# Patient Record
Sex: FEMALE | Race: OTHER | HISPANIC OR LATINO | URBAN - METROPOLITAN AREA
[De-identification: names, ages, dates, MRNs, and addresses within clinical notes are randomized per-mention and may not be internally consistent; named-entity substitution may affect disease eponyms.]

---

## 2023-09-02 ENCOUNTER — EMERGENCY (EMERGENCY)
Facility: HOSPITAL | Age: 43
LOS: 1 days | Discharge: HOME | End: 2023-09-02
Attending: EMERGENCY MEDICINE
Payer: MEDICAID

## 2023-09-02 VITALS
WEIGHT: 177.91 LBS | OXYGEN SATURATION: 99 % | SYSTOLIC BLOOD PRESSURE: 122 MMHG | TEMPERATURE: 99 F | RESPIRATION RATE: 18 BRPM | DIASTOLIC BLOOD PRESSURE: 77 MMHG | HEART RATE: 86 BPM

## 2023-09-02 PROCEDURE — 99285 EMERGENCY DEPT VISIT HI MDM: CPT

## 2023-09-03 VITALS
RESPIRATION RATE: 16 BRPM | TEMPERATURE: 98 F | DIASTOLIC BLOOD PRESSURE: 67 MMHG | SYSTOLIC BLOOD PRESSURE: 120 MMHG | HEART RATE: 81 BPM | OXYGEN SATURATION: 97 %

## 2023-09-03 LAB
ALBUMIN SERPL ELPH-MCNC: 4.3 G/DL — SIGNIFICANT CHANGE UP (ref 3.3–5)
ALP SERPL-CCNC: 52 U/L — SIGNIFICANT CHANGE UP (ref 40–120)
ALT FLD-CCNC: 20 U/L — SIGNIFICANT CHANGE UP (ref 10–45)
ANION GAP SERPL CALC-SCNC: 12 MMOL/L — SIGNIFICANT CHANGE UP (ref 5–17)
APPEARANCE UR: CLEAR — SIGNIFICANT CHANGE UP
AST SERPL-CCNC: 25 U/L — SIGNIFICANT CHANGE UP (ref 10–40)
BACTERIA # UR AUTO: ABNORMAL
BASOPHILS # BLD AUTO: 0.03 K/UL — SIGNIFICANT CHANGE UP (ref 0–0.2)
BASOPHILS NFR BLD AUTO: 0.6 % — SIGNIFICANT CHANGE UP (ref 0–2)
BILIRUB SERPL-MCNC: 1.4 MG/DL — HIGH (ref 0.2–1.2)
BILIRUB UR-MCNC: NEGATIVE — SIGNIFICANT CHANGE UP
BUN SERPL-MCNC: 9 MG/DL — SIGNIFICANT CHANGE UP (ref 7–23)
CALCIUM SERPL-MCNC: 9.2 MG/DL — SIGNIFICANT CHANGE UP (ref 8.4–10.5)
CHLORIDE SERPL-SCNC: 105 MMOL/L — SIGNIFICANT CHANGE UP (ref 96–108)
CO2 SERPL-SCNC: 22 MMOL/L — SIGNIFICANT CHANGE UP (ref 22–31)
COLOR SPEC: SIGNIFICANT CHANGE UP
CREAT SERPL-MCNC: 0.51 MG/DL — SIGNIFICANT CHANGE UP (ref 0.5–1.3)
DIFF PNL FLD: NEGATIVE — SIGNIFICANT CHANGE UP
EGFR: 121 ML/MIN/1.73M2 — SIGNIFICANT CHANGE UP
EOSINOPHIL # BLD AUTO: 0.06 K/UL — SIGNIFICANT CHANGE UP (ref 0–0.5)
EOSINOPHIL NFR BLD AUTO: 1.1 % — SIGNIFICANT CHANGE UP (ref 0–6)
EPI CELLS # UR: 6 /HPF — HIGH
GLUCOSE SERPL-MCNC: 96 MG/DL — SIGNIFICANT CHANGE UP (ref 70–99)
GLUCOSE UR QL: NEGATIVE — SIGNIFICANT CHANGE UP
HCG SERPL-ACNC: <2 MIU/ML — SIGNIFICANT CHANGE UP
HCT VFR BLD CALC: 37.2 % — SIGNIFICANT CHANGE UP (ref 34.5–45)
HGB BLD-MCNC: 12.3 G/DL — SIGNIFICANT CHANGE UP (ref 11.5–15.5)
HYALINE CASTS # UR AUTO: 1 /LPF — SIGNIFICANT CHANGE UP (ref 0–2)
IMM GRANULOCYTES NFR BLD AUTO: 0.2 % — SIGNIFICANT CHANGE UP (ref 0–0.9)
KETONES UR-MCNC: NEGATIVE — SIGNIFICANT CHANGE UP
LEUKOCYTE ESTERASE UR-ACNC: NEGATIVE — SIGNIFICANT CHANGE UP
LIDOCAIN IGE QN: 23 U/L — SIGNIFICANT CHANGE UP (ref 7–60)
LYMPHOCYTES # BLD AUTO: 2.1 K/UL — SIGNIFICANT CHANGE UP (ref 1–3.3)
LYMPHOCYTES # BLD AUTO: 39.7 % — SIGNIFICANT CHANGE UP (ref 13–44)
MCHC RBC-ENTMCNC: 29.8 PG — SIGNIFICANT CHANGE UP (ref 27–34)
MCHC RBC-ENTMCNC: 33.1 GM/DL — SIGNIFICANT CHANGE UP (ref 32–36)
MCV RBC AUTO: 90.1 FL — SIGNIFICANT CHANGE UP (ref 80–100)
MONOCYTES # BLD AUTO: 0.73 K/UL — SIGNIFICANT CHANGE UP (ref 0–0.9)
MONOCYTES NFR BLD AUTO: 13.8 % — SIGNIFICANT CHANGE UP (ref 2–14)
NEUTROPHILS # BLD AUTO: 2.36 K/UL — SIGNIFICANT CHANGE UP (ref 1.8–7.4)
NEUTROPHILS NFR BLD AUTO: 44.6 % — SIGNIFICANT CHANGE UP (ref 43–77)
NITRITE UR-MCNC: NEGATIVE — SIGNIFICANT CHANGE UP
NRBC # BLD: 0 /100 WBCS — SIGNIFICANT CHANGE UP (ref 0–0)
PH UR: 6.5 — SIGNIFICANT CHANGE UP (ref 5–8)
PLATELET # BLD AUTO: 211 K/UL — SIGNIFICANT CHANGE UP (ref 150–400)
POTASSIUM SERPL-MCNC: 4.1 MMOL/L — SIGNIFICANT CHANGE UP (ref 3.5–5.3)
POTASSIUM SERPL-SCNC: 4.1 MMOL/L — SIGNIFICANT CHANGE UP (ref 3.5–5.3)
PROT SERPL-MCNC: 7.2 G/DL — SIGNIFICANT CHANGE UP (ref 6–8.3)
PROT UR-MCNC: NEGATIVE — SIGNIFICANT CHANGE UP
RAPID RVP RESULT: DETECTED
RBC # BLD: 4.13 M/UL — SIGNIFICANT CHANGE UP (ref 3.8–5.2)
RBC # FLD: 13.3 % — SIGNIFICANT CHANGE UP (ref 10.3–14.5)
RBC CASTS # UR COMP ASSIST: 2 /HPF — SIGNIFICANT CHANGE UP (ref 0–4)
SARS-COV-2 RNA SPEC QL NAA+PROBE: DETECTED
SODIUM SERPL-SCNC: 139 MMOL/L — SIGNIFICANT CHANGE UP (ref 135–145)
SP GR SPEC: 1.01 — LOW (ref 1.01–1.02)
TROPONIN T, HIGH SENSITIVITY RESULT: <6 NG/L — SIGNIFICANT CHANGE UP (ref 0–51)
UROBILINOGEN FLD QL: NEGATIVE — SIGNIFICANT CHANGE UP
WBC # BLD: 5.29 K/UL — SIGNIFICANT CHANGE UP (ref 3.8–10.5)
WBC # FLD AUTO: 5.29 K/UL — SIGNIFICANT CHANGE UP (ref 3.8–10.5)
WBC UR QL: 1 /HPF — SIGNIFICANT CHANGE UP (ref 0–5)

## 2023-09-03 PROCEDURE — 96374 THER/PROPH/DIAG INJ IV PUSH: CPT

## 2023-09-03 PROCEDURE — 96375 TX/PRO/DX INJ NEW DRUG ADDON: CPT

## 2023-09-03 PROCEDURE — 84484 ASSAY OF TROPONIN QUANT: CPT

## 2023-09-03 PROCEDURE — 71045 X-RAY EXAM CHEST 1 VIEW: CPT | Mod: 26

## 2023-09-03 PROCEDURE — 81001 URINALYSIS AUTO W/SCOPE: CPT

## 2023-09-03 PROCEDURE — 83690 ASSAY OF LIPASE: CPT

## 2023-09-03 PROCEDURE — 99285 EMERGENCY DEPT VISIT HI MDM: CPT | Mod: 25

## 2023-09-03 PROCEDURE — 85025 COMPLETE CBC W/AUTO DIFF WBC: CPT

## 2023-09-03 PROCEDURE — 36415 COLL VENOUS BLD VENIPUNCTURE: CPT

## 2023-09-03 PROCEDURE — 84702 CHORIONIC GONADOTROPIN TEST: CPT

## 2023-09-03 PROCEDURE — 80053 COMPREHEN METABOLIC PANEL: CPT

## 2023-09-03 PROCEDURE — 87086 URINE CULTURE/COLONY COUNT: CPT

## 2023-09-03 PROCEDURE — 0225U NFCT DS DNA&RNA 21 SARSCOV2: CPT

## 2023-09-03 PROCEDURE — 71045 X-RAY EXAM CHEST 1 VIEW: CPT

## 2023-09-03 PROCEDURE — 93005 ELECTROCARDIOGRAM TRACING: CPT

## 2023-09-03 RX ORDER — KETOROLAC TROMETHAMINE 30 MG/ML
15 SYRINGE (ML) INJECTION ONCE
Refills: 0 | Status: DISCONTINUED | OUTPATIENT
Start: 2023-09-03 | End: 2023-09-03

## 2023-09-03 RX ORDER — FAMOTIDINE 10 MG/ML
20 INJECTION INTRAVENOUS DAILY
Refills: 0 | Status: DISCONTINUED | OUTPATIENT
Start: 2023-09-03 | End: 2023-09-06

## 2023-09-03 RX ORDER — FAMOTIDINE 10 MG/ML
20 INJECTION INTRAVENOUS DAILY
Refills: 0 | Status: DISCONTINUED | OUTPATIENT
Start: 2023-09-03 | End: 2023-09-03

## 2023-09-03 RX ORDER — ONDANSETRON 8 MG/1
1 TABLET, FILM COATED ORAL
Qty: 6 | Refills: 0
Start: 2023-09-03 | End: 2023-09-04

## 2023-09-03 RX ORDER — SODIUM CHLORIDE 9 MG/ML
1000 INJECTION INTRAMUSCULAR; INTRAVENOUS; SUBCUTANEOUS ONCE
Refills: 0 | Status: COMPLETED | OUTPATIENT
Start: 2023-09-03 | End: 2023-09-03

## 2023-09-03 RX ADMIN — SODIUM CHLORIDE 1000 MILLILITER(S): 9 INJECTION INTRAMUSCULAR; INTRAVENOUS; SUBCUTANEOUS at 01:48

## 2023-09-03 RX ADMIN — Medication 100 MILLIGRAM(S): at 01:47

## 2023-09-03 RX ADMIN — Medication 15 MILLIGRAM(S): at 01:47

## 2023-09-03 RX ADMIN — FAMOTIDINE 20 MILLIGRAM(S): 10 INJECTION INTRAVENOUS at 01:47

## 2023-09-03 NOTE — ED PROVIDER NOTE - CLINICAL SUMMARY MEDICAL DECISION MAKING FREE TEXT BOX
Attending note (Levon): 43y/o F with no reported medical comorbidities, c/o headache, body aches, neck pain, cough (non productive), congestion, sore/itching throat, nausea, vomiting (1 episode yesterday), and fever, for the past 3 days.  Likely viral syndrome; obtain CXr to check for consolidation/pneumonia; send RVP/flu/covid test, obtain screening labs: cbc (to evaluate for leukocytosis or anemia), CMP (to evaluate for electrolyte abnormalities or renal/liver dysfunction) and troponin (eval for nstemi/myocarditis; ecg nonischemic, very low suspicion this presentation represents ACS). Offer iv fluids, tylenol/nsaids, and reassess after labs/imaging; if no acute actionable findings on labs/cxr, can dc with symptomatic management for viral infection and dc with outpatient f/u.  No nuchal rigidtiy to suggest meningitis, no focal neuro deficits and does not appear encephalopathic.

## 2023-09-03 NOTE — ED PROVIDER NOTE - NSFOLLOWUPINSTRUCTIONS_ED_ALL_ED_FT
La vieron en el departamento de emergencias por dolor de naseem y tos. Vonnie toda la información adjunta, vonnie todas las instrucciones adicionales y juan mehran sofiya con todos los proveedores según las indicaciones.    1) Juan sofiya con french doctor primario en 1-3 días.     2) Continúe tomando todos los medicamentos según lo prescrito.    3) Descanse y mantengase hidratada. El dolor se puede controlar con acetaminofén (también conocido pebbles Tylenol) e ibuprofeno (también conocido pebbles Motrin o Advil) sin receta, según las indicaciones escritas en las botellas de los medicamentos.     4) Regrese a la dante de emergencias por cualquier síntoma nuevo o que empeore.      Vonnie toda la información del paciente adjunta.

## 2023-09-03 NOTE — ED PROVIDER NOTE - PHYSICAL EXAMINATION
On Physical Exam:  General: well appearing, in NAD, speaking clearly in full sentences and without difficulty; cooperative with exam  HEENT: PERRL, MMM, oropharynx clear, no tonsillar enlargement/deviation, no uvula enlargement/deviation  Neck: no neck tenderness, no nuchal rigidity, no JVD  Cardiac: normal s1, s2; RRR; no MGR  Lungs: CTABL  Abdomen: soft nontender/nondistended  : no bladder tenderness or distension  Skin: intact, no rash  Extremities: no peripheral edema, no gross deformities  Neuro: no gross neurologic deficits

## 2023-09-03 NOTE — ED PROVIDER NOTE - OBJECTIVE STATEMENT
Attending note (Levon): 41y/o F with no reported medical comorbidities, c/o headache, body aches, neck pain, cough (non productive), congestion, sore/itching throat, nausea, vomiting (1 episode yesterday), and fever, for the past 3 days.  HA predominantly on the left side of head right now, but sometimes on back of head or other parts of head; intermittent through day, described as stabbing pain.  Also today having some pain in the mid chest, worse with deep breaths. No SOB. Also felt weak all over her body since thursday.  No recent travel. No known sick contacts.    language line : [id number]

## 2023-09-03 NOTE — ED PROVIDER NOTE - PATIENT PORTAL LINK FT
You can access the FollowMyHealth Patient Portal offered by [facility] by registering at the following website: [email or web address]. By joining [employer]’s FollowMyHealth portal, you will also be able to view your health information using other applications (apps) compatible with our system.

## 2023-09-03 NOTE — ED ADULT NURSE NOTE - NSFALLUNIVINTERV_ED_ALL_ED
Bed/Stretcher in lowest position, wheels locked, appropriate side rails in place/Call bell, personal items and telephone in reach/Instruct patient to call for assistance before getting out of bed/chair/stretcher/Non-slip footwear applied when patient is off stretcher/[place] to call system/Physically safe environment - no spills, clutter or unnecessary equipment/Purposeful proactive rounding/Room/bathroom lighting operational, light cord in reach

## 2023-09-03 NOTE — ED PROVIDER NOTE - NS ED ROS FT
Review of Systems:  -General: +fever   -ENT: +congestion, no difficulty swallowing  -Pulmonary: +cough, no shortness of breath  -Cardiac: +chest pain, no palpitations  -Gastrointestinal: no abdominal pain, +nausea, +vomiting, and no diarrhea.  -Genitourinary: no blood or pain with urination  -Musculoskeletal: no back   -Skin: no rashes  -Endocrine: No h/o diabetes   -Neurologic: No new weakness or numbness in extremities    All else negative unless otherwise specified elsewhere in this note.

## 2023-09-03 NOTE — ED PROVIDER NOTE - PROGRESS NOTE DETAILS
Lynn Rocha, PGY-2: Pt reports improvement in symptoms. Passed PO challenge. No vomiting while here. Will send zofran/tessalon perles to pharmacy

## 2023-09-03 NOTE — ED ADULT NURSE NOTE - OBJECTIVE STATEMENT
42y Female presents to the ED c/o HA, body aches, nausea, vomiting, cough, congestion, sore throat, and neck pain. Pt endorsing symptoms for last 3 days. Pt endorsing L sided HA with radiation to back of head. Pt reports chest pain worsening with inhalation. Pt denies SOB. Pt is A&Ox4, and ambulatory. Respirations spontaneous, unlabored, and equal bilaterally. Patient safety maintained, bed is in lowest position, wheels locked, and side rails raised. Patient oriented to call bell, and call bell is within reach.

## 2023-09-04 LAB
CULTURE RESULTS: SIGNIFICANT CHANGE UP
SPECIMEN SOURCE: SIGNIFICANT CHANGE UP

## 2023-09-04 NOTE — ED POST DISCHARGE NOTE - RESULT SUMMARY
Pt called for RVP result, informed +COVID. Reiterated supportive measures, isolation and strict return precautions. - MARCO ANTONIO FunesC

## 2024-10-19 NOTE — ED ADULT TRIAGE NOTE - BEFAST ARM SIDE DRIFT
No

## 2025-04-05 ENCOUNTER — EMERGENCY (EMERGENCY)
Facility: HOSPITAL | Age: 45
LOS: 1 days | End: 2025-04-05
Attending: STUDENT IN AN ORGANIZED HEALTH CARE EDUCATION/TRAINING PROGRAM
Payer: COMMERCIAL

## 2025-04-05 VITALS
HEIGHT: 60 IN | DIASTOLIC BLOOD PRESSURE: 73 MMHG | TEMPERATURE: 98 F | HEART RATE: 86 BPM | OXYGEN SATURATION: 97 % | SYSTOLIC BLOOD PRESSURE: 120 MMHG | RESPIRATION RATE: 16 BRPM | WEIGHT: 184.09 LBS

## 2025-04-05 LAB
ANION GAP SERPL CALC-SCNC: 6 MMOL/L — SIGNIFICANT CHANGE UP (ref 5–17)
APPEARANCE UR: CLEAR — SIGNIFICANT CHANGE UP
BACTERIA # UR AUTO: ABNORMAL /HPF
BILIRUB UR-MCNC: NEGATIVE — SIGNIFICANT CHANGE UP
BUN SERPL-MCNC: 9 MG/DL — SIGNIFICANT CHANGE UP (ref 7–18)
CALCIUM SERPL-MCNC: 8.8 MG/DL — SIGNIFICANT CHANGE UP (ref 8.4–10.5)
CHLORIDE SERPL-SCNC: 103 MMOL/L — SIGNIFICANT CHANGE UP (ref 96–108)
CO2 SERPL-SCNC: 27 MMOL/L — SIGNIFICANT CHANGE UP (ref 22–31)
COLOR SPEC: YELLOW — SIGNIFICANT CHANGE UP
CREAT SERPL-MCNC: 0.61 MG/DL — SIGNIFICANT CHANGE UP (ref 0.5–1.3)
DIFF PNL FLD: ABNORMAL
EGFR: 114 ML/MIN/1.73M2 — SIGNIFICANT CHANGE UP
EGFR: 114 ML/MIN/1.73M2 — SIGNIFICANT CHANGE UP
EPI CELLS # UR: PRESENT
GLUCOSE SERPL-MCNC: 90 MG/DL — SIGNIFICANT CHANGE UP (ref 70–99)
GLUCOSE UR QL: NEGATIVE MG/DL — SIGNIFICANT CHANGE UP
HCG UR QL: NEGATIVE — SIGNIFICANT CHANGE UP
HCT VFR BLD CALC: 38.6 % — SIGNIFICANT CHANGE UP (ref 34.5–45)
HGB BLD-MCNC: 12.9 G/DL — SIGNIFICANT CHANGE UP (ref 11.5–15.5)
KETONES UR-MCNC: NEGATIVE MG/DL — SIGNIFICANT CHANGE UP
LEUKOCYTE ESTERASE UR-ACNC: NEGATIVE — SIGNIFICANT CHANGE UP
MCHC RBC-ENTMCNC: 29.3 PG — SIGNIFICANT CHANGE UP (ref 27–34)
MCHC RBC-ENTMCNC: 33.4 G/DL — SIGNIFICANT CHANGE UP (ref 32–36)
MCV RBC AUTO: 87.7 FL — SIGNIFICANT CHANGE UP (ref 80–100)
NITRITE UR-MCNC: NEGATIVE — SIGNIFICANT CHANGE UP
NRBC BLD AUTO-RTO: 0 /100 WBCS — SIGNIFICANT CHANGE UP (ref 0–0)
PH UR: 6 — SIGNIFICANT CHANGE UP (ref 5–8)
PLATELET # BLD AUTO: 290 K/UL — SIGNIFICANT CHANGE UP (ref 150–400)
POTASSIUM SERPL-MCNC: 4.1 MMOL/L — SIGNIFICANT CHANGE UP (ref 3.5–5.3)
POTASSIUM SERPL-SCNC: 4.1 MMOL/L — SIGNIFICANT CHANGE UP (ref 3.5–5.3)
PROT UR-MCNC: NEGATIVE MG/DL — SIGNIFICANT CHANGE UP
RBC # BLD: 4.4 M/UL — SIGNIFICANT CHANGE UP (ref 3.8–5.2)
RBC # FLD: 13 % — SIGNIFICANT CHANGE UP (ref 10.3–14.5)
RBC CASTS # UR COMP ASSIST: SIGNIFICANT CHANGE UP /HPF (ref 0–4)
SODIUM SERPL-SCNC: 136 MMOL/L — SIGNIFICANT CHANGE UP (ref 135–145)
SP GR SPEC: 1.02 — SIGNIFICANT CHANGE UP (ref 1–1.03)
UROBILINOGEN FLD QL: 0.2 MG/DL — SIGNIFICANT CHANGE UP (ref 0.2–1)
WBC # BLD: 6.98 K/UL — SIGNIFICANT CHANGE UP (ref 3.8–10.5)
WBC # FLD AUTO: 6.98 K/UL — SIGNIFICANT CHANGE UP (ref 3.8–10.5)
WBC UR QL: 1 /HPF — SIGNIFICANT CHANGE UP (ref 0–5)

## 2025-04-05 PROCEDURE — 99283 EMERGENCY DEPT VISIT LOW MDM: CPT

## 2025-04-05 PROCEDURE — 81025 URINE PREGNANCY TEST: CPT

## 2025-04-05 PROCEDURE — 80048 BASIC METABOLIC PNL TOTAL CA: CPT

## 2025-04-05 PROCEDURE — 85027 COMPLETE CBC AUTOMATED: CPT

## 2025-04-05 PROCEDURE — 81001 URINALYSIS AUTO W/SCOPE: CPT

## 2025-04-05 PROCEDURE — 99284 EMERGENCY DEPT VISIT MOD MDM: CPT

## 2025-04-05 PROCEDURE — 36415 COLL VENOUS BLD VENIPUNCTURE: CPT

## 2025-04-05 RX ORDER — AMOXICILLIN AND CLAVULANATE POTASSIUM 500; 125 MG/1; MG/1
1 TABLET, FILM COATED ORAL
Qty: 14 | Refills: 0
Start: 2025-04-05 | End: 2025-04-11

## 2025-04-05 RX ORDER — IBUPROFEN 200 MG
600 TABLET ORAL ONCE
Refills: 0 | Status: COMPLETED | OUTPATIENT
Start: 2025-04-05 | End: 2025-04-05

## 2025-04-05 RX ORDER — ACETAMINOPHEN 500 MG/5ML
650 LIQUID (ML) ORAL ONCE
Refills: 0 | Status: COMPLETED | OUTPATIENT
Start: 2025-04-05 | End: 2025-04-05

## 2025-04-05 RX ORDER — IBUPROFEN 200 MG
1 TABLET ORAL
Qty: 42 | Refills: 0
Start: 2025-04-05 | End: 2025-04-18

## 2025-04-05 RX ORDER — ACETAMINOPHEN 500 MG/5ML
2 LIQUID (ML) ORAL
Qty: 112 | Refills: 0
Start: 2025-04-05 | End: 2025-04-18

## 2025-04-05 RX ADMIN — Medication 650 MILLIGRAM(S): at 15:41

## 2025-04-05 RX ADMIN — Medication 650 MILLIGRAM(S): at 15:11

## 2025-04-05 NOTE — ED ADULT TRIAGE NOTE - NSWEIGHTCALCTOOLDRUG_GEN_A_CORE
Goal Outcome Evaluation:  No acute issues overnight. Awake initial rounds - had no c/o's or requests @ that time. Short while later, staff assisted w/turning and repositioning and applying nasal cpap. Purewick intact/patent. Appears sleeping well - call light within reach.        Patient's most recent vital signs are:     Vital signs:  BP: 125/63  Temp: 97.1  HR: 77  RR: 16  SpO2: 92 %     Patient does not have new respiratory symptoms.  Patient does not have new sore throat.  Patient does not have a fever greater than 99.5.                               used

## 2025-04-05 NOTE — ED PROVIDER NOTE - PATIENT PORTAL LINK FT
You can access the FollowMyHealth Patient Portal offered by [facility] by registering at the following website: [email or web address]. By joining [employer]’s FollowMyHealth portal, you will also be able to view your health information using other applications (apps) compatible with our system.

## 2025-04-05 NOTE — ED PROVIDER NOTE - OBJECTIVE STATEMENT
44 female presents for left ear pain for 2 weeks.  Patient also reports bladder prolapse.  Patient reports history of 5 pregnancies resulting in 5 children via .  Reports while in [place] recently it occurred while in the shower.  Patient reports burning sensation and pain in her genital area.  States she has an appointment  to see a gynecologist but reports that she is not sure if she can wait that long due to the pain and discomfort..

## 2025-04-05 NOTE — ED PROVIDER NOTE - NSFOLLOWUPINSTRUCTIONS_ED_ALL_ED_FT
Instrucciones de tammy para el prolapso de vejiga  Le suresh diagnosticado un prolapso de vejiga (cistocele). Estas instrucciones le ayudarán a controlar french afección y a mejorar french comodidad. Es fundamental que siga estas instrucciones y que se ponga en contacto con french médico si experimenta un aumento de la presión pélvica, dificultad para vaciar la vejiga, dolor de espalda nuevo o que empeora, o signos de mehran infección urinaria.    1. Modificaciones en el estilo de hugo:    Ejercicios de Kegel: Los ejercicios de Kegel regulares pueden fortalecer los músculos del suelo pélvico y ayudar a sostener la vejiga. French médico o fisioterapeuta pueden enseñarle cómo hacer estos ejercicios correctamente.  Control del peso: Mantener un peso saludable puede reducir la presión sobre el suelo pélvico.  Evitar el esfuerzo: Evite las actividades que fuerzan los músculos del suelo pélvico, pebbles levantar objetos pesados, la tos crónica y el estreñimiento. Trate el estreñimiento crónico aumentando la ingesta de fibra, líquidos y ablandadores de heces según sea necesario.  Dejar de fumar: Fumar puede empeorar un prolapso. Si fuma, dejar de hacerlo es importante para french imani en general y puede afectar positivamente french prolapso.  2. Cuidado del pesario (si corresponde):    Si le suresh colocado un pesario, siga las instrucciones de french médico sobre cómo cuidarlo. [place] puede incluir la limpieza y la extracción regulares.  Informe a french médico sobre cualquier molestia o dificultad con el pesario.  3. Control de los síntomas:    Crema de estrógenos (si se prescribe): La crema de estrógenos tópica puede ayudar a mejorar el augusto del tejido vaginal y a reducir algunos síntomas del prolapso. Úsela según las indicaciones de french médico.  Ingesta de líquidos: Karolyn muchos líquidos a lo vladimir del día, a menos que french médico le indique lo contrario.  4. Modificación de la actividad:    Evite las actividades de alto impacto: Evite las actividades que ejerzan presión sobre el suelo pélvico, pebbles correr, saltar y levantar objetos pesados.  Reanudación gradual de las actividades: Aumente gradualmente french nivel de actividad según lo tolere. Escuche a french cuerpo y evite las actividades que empeoren rachid síntomas.  5. Atención de seguimiento:    Programe controles regulares con french médico: El seguimiento regular es esencial para controlar french prolapso y ajustar french plan de tratamiento según sea necesario. French médico evaluará la eficacia de french tratamiento actual y discutirá otras opciones si es necesario.  6. Cuándo buscar atención médica:    Aumento de la presión o abultamiento pélvico  Dificultad para vaciar la vejiga por completo  Síntomas de infección urinaria (dolor al orinar, micción frecuente, urgencia, fiebre)  Dolor de espalda nuevo o que empeora  Sangrado o secreción vaginal  Estreñimiento que no mejora con los cambios en el estilo de hugo  Cualquier preocupación sobre french afección  7. Opciones quirúrgicas (si corresponde):    Si rachid síntomas son graves o no responden al tratamiento conservador, french médico puede recomendarle mehran cirugía para reparar el prolapso. Hable con french médico sobre los riesgos y beneficios de la cirugía.    ----------------------------------------------------------------    Instrucciones de Tammy para la Otitis Media (Infección del Oído)  Le suresh diagnosticado otitis media (mehran infección de oído). Estas instrucciones le ayudarán a controlar las molestias y a asegurar mehran curación adecuada. Es importante que siga estas instrucciones y que se comunique con french médico si rachid síntomas empeoran, tiene fiebre, dolor de oído intenso o supuración del oído.    1. Control del dolor:    Analgésicos de venta siri: Puede santiago acetaminofén (Tylenol) o ibuprofeno (Advil, Motrin) para el dolor y la fiebre. Siga las instrucciones de dosificación de la etiqueta. Para los niños, use la dosis pediátrica adecuada.  Gotas para los oídos (si se las recetan): Si french médico le recetó gotas para los oídos, úselas exactamente pebbles se las indicó. Asegúrese de que el canal auditivo esté despejado antes de administrar las gotas. Para los niños, tire suavemente del lóbulo de la oreja hacia abajo y hacia atrás para enderezar el canal auditivo. Para los adultos, tire del lóbulo de la oreja hacia arriba y hacia atrás.  Compresa tibia: Aplicar mehran compresa tibia en el oído afectado puede ayudar a aliviar el dolor.  2. Antibióticos (si se recetan):    Si french médico le recetó antibióticos, es fundamental que complete el ciclo de tratamiento según las indicaciones, incluso si rachid síntomas mejoran. Interrumpir el tratamiento con antibióticos antes de tiempo puede provocar resistencia a los antibióticos e infecciones recurrentes.  3. Monitoreo:    Esté atento al empeoramiento de los síntomas: Comuníquese con french médico si experimenta alguno de los siguientes síntomas:  Aumento del dolor de oído  Fiebre  Supuración del oído (especialmente supuración con pus o kinjal)  Pérdida de audición  Mareos  Hinchazón detrás de la oreja  Rigidez en el marquis  Dolor de naseem intenso  4. Atención de seguimiento:    Es posible que french médico quiera verlo para mehran sofiya de seguimiento a fin de asegurarse de que la infección haya desaparecido. Asista a esta sofiya, incluso si se siente mejor.

## 2025-04-05 NOTE — ED PROVIDER NOTE - NSFOLLOWUPCLINICS_GEN_ALL_ED_FT
[facility]  OBGYN  [address] [place] [address]  Phone: [phone]  Fax: [phone]  Follow Up Time: Urgent    [facility]  Internal Medicine  [address] [place] [address]  Phone: [phone]  Fax: [phone]  Follow Up Time: 4-6 Days

## 2025-04-05 NOTE — ED PROVIDER NOTE - CLINICAL SUMMARY MEDICAL DECISION MAKING FREE TEXT BOX
Right ear bulging erythematous, tender with speculum insertion.  UA negative for signs infection.   No evidence of bladder prolapse, instructed to follow-up with GYN and discussed supportive measures.